# Patient Record
Sex: MALE | Race: WHITE | NOT HISPANIC OR LATINO | URBAN - METROPOLITAN AREA
[De-identification: names, ages, dates, MRNs, and addresses within clinical notes are randomized per-mention and may not be internally consistent; named-entity substitution may affect disease eponyms.]

---

## 2022-07-19 PROBLEM — E66.3 OVERWEIGHT: Status: ACTIVE | Noted: 2020-11-10

## 2022-09-07 ENCOUNTER — OFFICE (OUTPATIENT)
Dept: URBAN - METROPOLITAN AREA CLINIC 18 | Facility: CLINIC | Age: 42
End: 2022-09-07
Payer: MEDICAID

## 2022-09-07 VITALS
DIASTOLIC BLOOD PRESSURE: 82 MMHG | HEIGHT: 70 IN | WEIGHT: 315 LBS | SYSTOLIC BLOOD PRESSURE: 124 MMHG | HEART RATE: 78 BPM

## 2022-09-07 DIAGNOSIS — K51.80 OTHER ULCERATIVE COLITIS WITHOUT COMPLICATIONS: ICD-10-CM

## 2022-09-07 DIAGNOSIS — K21.9 GASTRO-ESOPHAGEAL REFLUX DISEASE WITHOUT ESOPHAGITIS: ICD-10-CM

## 2022-09-07 PROCEDURE — 99213 OFFICE O/P EST LOW 20 MIN: CPT | Performed by: INTERNAL MEDICINE

## 2022-09-07 RX ORDER — MESALAMINE 375 MG/1
CAPSULE, EXTENDED RELEASE ORAL
Qty: 180 | Refills: 3 | Status: ACTIVE
Start: 2021-05-03

## 2022-09-08 LAB
C-REACTIVE PROTEIN: 0.75 MG/DL
CBC, PLATELET CT  AND  DIFF: ABS BASOPHIL: 0 K/UL
CBC, PLATELET CT  AND  DIFF: ABS EOSINOPHIL: 0.1 K/UL
CBC, PLATELET CT  AND  DIFF: ABS IMMATURE GRANS: 0 K/UL
CBC, PLATELET CT  AND  DIFF: ABS LYMPHOCYTE: 1.1 K/UL
CBC, PLATELET CT  AND  DIFF: ABS MONOCYTE: 0.6 K/UL
CBC, PLATELET CT  AND  DIFF: ABS NEUTROPHIL: 3.7 K/UL
CBC, PLATELET CT  AND  DIFF: BASOPHIL: 0.4 %
CBC, PLATELET CT  AND  DIFF: DIFFERENTIAL: (no result)
CBC, PLATELET CT  AND  DIFF: EOSINOPHIL: 1.1 %
CBC, PLATELET CT  AND  DIFF: HEMATOCRIT: 43.5 %
CBC, PLATELET CT  AND  DIFF: HEMOGLOBIN: 15.2 G/DL
CBC, PLATELET CT  AND  DIFF: IMMATURE GRANULOCYTES: 0.2 %
CBC, PLATELET CT  AND  DIFF: LYMPHOCYTE: 20.3 %
CBC, PLATELET CT  AND  DIFF: MCH: 33.6 PG
CBC, PLATELET CT  AND  DIFF: MCHC: 34.9 G/DL
CBC, PLATELET CT  AND  DIFF: MCV: 96 FL
CBC, PLATELET CT  AND  DIFF: MONOCYTE: 10.2 %
CBC, PLATELET CT  AND  DIFF: MPV: 10.6 FL
CBC, PLATELET CT  AND  DIFF: NEUTROPHIL: 67.8 %
CBC, PLATELET CT  AND  DIFF: NRBCS: 0 /100 WBC
CBC, PLATELET CT  AND  DIFF: PLATELET COUNT: 246 K/UL
CBC, PLATELET CT  AND  DIFF: RBC: 4.53 M/UL
CBC, PLATELET CT  AND  DIFF: RDW: 13.6 %
CBC, PLATELET CT  AND  DIFF: WBC COUNT: 5.5 K/UL
COMPREHENSIVE METABOLIC PANEL: A/G RATIO: 1.5 RATIO
COMPREHENSIVE METABOLIC PANEL: ALBUMIN: 4.3 G/DL
COMPREHENSIVE METABOLIC PANEL: ALK PHOSPHATASE: 57 U/L
COMPREHENSIVE METABOLIC PANEL: ALT: 34 U/L
COMPREHENSIVE METABOLIC PANEL: AST: 26 U/L
COMPREHENSIVE METABOLIC PANEL: BILIRUBIN,TOTAL: 0.6 MG/DL
COMPREHENSIVE METABOLIC PANEL: BLOOD UREA NITROGEN: 11 MG/DL
COMPREHENSIVE METABOLIC PANEL: BUN/CREAT RATIO: 12
COMPREHENSIVE METABOLIC PANEL: CALCIUM: 9 MG/DL
COMPREHENSIVE METABOLIC PANEL: CHLORIDE: 103 MEQ/L
COMPREHENSIVE METABOLIC PANEL: CO2: 28 MEQ/L
COMPREHENSIVE METABOLIC PANEL: CREATININE: 0.9 MG/DL
COMPREHENSIVE METABOLIC PANEL: FASTING STATUS: (no result)
COMPREHENSIVE METABOLIC PANEL: GLOBULIN: 2.9 G/DL
COMPREHENSIVE METABOLIC PANEL: GLOMERULAR FILTRATION RATE (GFR): 111 MLS/MIN/1.73M2
COMPREHENSIVE METABOLIC PANEL: GLUCOSE,RANDOM: 99 MG/DL
COMPREHENSIVE METABOLIC PANEL: POTASSIUM: 4.1 MEQ/L
COMPREHENSIVE METABOLIC PANEL: SODIUM: 140 MEQ/L
COMPREHENSIVE METABOLIC PANEL: TOTAL PROTEIN: 7.2 G/DL
ESR: 11 MM/HR

## 2023-09-19 ENCOUNTER — OFFICE (OUTPATIENT)
Dept: URBAN - METROPOLITAN AREA CLINIC 18 | Facility: CLINIC | Age: 43
End: 2023-09-19

## 2023-09-19 VITALS
SYSTOLIC BLOOD PRESSURE: 134 MMHG | WEIGHT: 315 LBS | HEART RATE: 79 BPM | HEIGHT: 70 IN | DIASTOLIC BLOOD PRESSURE: 82 MMHG

## 2023-09-19 DIAGNOSIS — K51.80 OTHER ULCERATIVE COLITIS WITHOUT COMPLICATIONS: ICD-10-CM

## 2023-09-19 DIAGNOSIS — K21.9 GASTRO-ESOPHAGEAL REFLUX DISEASE WITHOUT ESOPHAGITIS: ICD-10-CM

## 2023-09-19 DIAGNOSIS — Z86.010 PERSONAL HISTORY OF COLONIC POLYPS: ICD-10-CM

## 2023-09-19 PROCEDURE — 99213 OFFICE O/P EST LOW 20 MIN: CPT | Performed by: INTERNAL MEDICINE

## 2023-09-19 RX ORDER — MESALAMINE 375 MG/1
CAPSULE, EXTENDED RELEASE ORAL
Qty: 180 | Refills: 3 | Status: ACTIVE
Start: 2021-05-03

## 2023-09-19 RX ORDER — DICYCLOMINE HYDROCHLORIDE 20 MG/1
60 TABLET ORAL
Qty: 90 | Refills: 11 | Status: ACTIVE
Start: 2022-12-21

## 2023-09-20 LAB
C-REACTIVE PROTEIN: 0.69 MG/DL
CBC, PLATELET CT  AND  DIFF: ABS BASOPHIL: 0 K/UL
CBC, PLATELET CT  AND  DIFF: ABS EOSINOPHIL: 0.1 K/UL
CBC, PLATELET CT  AND  DIFF: ABS IMMATURE GRANS: 0 K/UL
CBC, PLATELET CT  AND  DIFF: ABS LYMPHOCYTE: 1 K/UL
CBC, PLATELET CT  AND  DIFF: ABS MONOCYTE: 0.4 K/UL
CBC, PLATELET CT  AND  DIFF: ABS NEUTROPHIL: 3.9 K/UL
CBC, PLATELET CT  AND  DIFF: BASOPHIL: 0.4 %
CBC, PLATELET CT  AND  DIFF: DIFFERENTIAL: (no result)
CBC, PLATELET CT  AND  DIFF: EOSINOPHIL: 1.1 %
CBC, PLATELET CT  AND  DIFF: HEMATOCRIT: 40 %
CBC, PLATELET CT  AND  DIFF: HEMOGLOBIN: 14.7 G/DL
CBC, PLATELET CT  AND  DIFF: IMMATURE GRANULOCYTES: 0.2 %
CBC, PLATELET CT  AND  DIFF: LYMPHOCYTE: 18.3 %
CBC, PLATELET CT  AND  DIFF: MCH: 35.4 PG — HIGH
CBC, PLATELET CT  AND  DIFF: MCHC: 36.8 G/DL — HIGH
CBC, PLATELET CT  AND  DIFF: MCV: 96.4 FL
CBC, PLATELET CT  AND  DIFF: MONOCYTE: 7.5 %
CBC, PLATELET CT  AND  DIFF: MPV: 10.4 FL
CBC, PLATELET CT  AND  DIFF: NEUTROPHIL: 72.5 %
CBC, PLATELET CT  AND  DIFF: NRBCS: 0 /100 WBC
CBC, PLATELET CT  AND  DIFF: PLATELET COUNT: 246 K/UL
CBC, PLATELET CT  AND  DIFF: RBC: 4.15 M/UL
CBC, PLATELET CT  AND  DIFF: RDW: 13.4 %
CBC, PLATELET CT  AND  DIFF: WBC COUNT: 5.4 K/UL
COMPREHENSIVE METABOLIC PANEL: A/G RATIO: 1.3 RATIO
COMPREHENSIVE METABOLIC PANEL: ALBUMIN: 4.1 G/DL
COMPREHENSIVE METABOLIC PANEL: ALK PHOSPHATASE: 52 U/L
COMPREHENSIVE METABOLIC PANEL: ALT: 38 U/L
COMPREHENSIVE METABOLIC PANEL: AST: 31 U/L
COMPREHENSIVE METABOLIC PANEL: BILIRUBIN,TOTAL: 0.7 MG/DL
COMPREHENSIVE METABOLIC PANEL: BLOOD UREA NITROGEN: 8 MG/DL
COMPREHENSIVE METABOLIC PANEL: BUN/CREAT RATIO: 11
COMPREHENSIVE METABOLIC PANEL: CALCIUM: 9 MG/DL
COMPREHENSIVE METABOLIC PANEL: CHLORIDE: 103 MEQ/L
COMPREHENSIVE METABOLIC PANEL: CO2: 24 MEQ/L
COMPREHENSIVE METABOLIC PANEL: CREATININE: 0.7 MG/DL
COMPREHENSIVE METABOLIC PANEL: FASTING STATUS: (no result)
COMPREHENSIVE METABOLIC PANEL: GLOBULIN: 3.1 G/DL
COMPREHENSIVE METABOLIC PANEL: GLOMERULAR FILTRATION RATE (GFR): 119 MLS/MIN/1.73M2
COMPREHENSIVE METABOLIC PANEL: GLUCOSE,RANDOM: 96 MG/DL
COMPREHENSIVE METABOLIC PANEL: POTASSIUM: 3.8 MEQ/L
COMPREHENSIVE METABOLIC PANEL: SODIUM: 140 MEQ/L
COMPREHENSIVE METABOLIC PANEL: TOTAL PROTEIN: 7.2 G/DL
ESR: 9 MM/HR

## 2023-10-14 ENCOUNTER — AMBULATORY SURGICAL CENTER (OUTPATIENT)
Dept: URBAN - METROPOLITAN AREA SURGERY 7 | Facility: SURGERY | Age: 43
End: 2023-10-14
Payer: MEDICARE

## 2023-10-14 ENCOUNTER — OFFICE (OUTPATIENT)
Dept: URBAN - METROPOLITAN AREA PATHOLOGY 1 | Facility: PATHOLOGY | Age: 43
End: 2023-10-14
Payer: MEDICARE

## 2023-10-14 VITALS
OXYGEN SATURATION: 76 % | DIASTOLIC BLOOD PRESSURE: 81 MMHG | HEART RATE: 81 BPM | SYSTOLIC BLOOD PRESSURE: 188 MMHG | DIASTOLIC BLOOD PRESSURE: 107 MMHG | HEART RATE: 74 BPM | DIASTOLIC BLOOD PRESSURE: 75 MMHG | RESPIRATION RATE: 24 BRPM | OXYGEN SATURATION: 96 % | SYSTOLIC BLOOD PRESSURE: 167 MMHG | HEART RATE: 86 BPM | DIASTOLIC BLOOD PRESSURE: 82 MMHG | WEIGHT: 315 LBS | RESPIRATION RATE: 11 BRPM | HEART RATE: 78 BPM | DIASTOLIC BLOOD PRESSURE: 111 MMHG | DIASTOLIC BLOOD PRESSURE: 73 MMHG | SYSTOLIC BLOOD PRESSURE: 189 MMHG | SYSTOLIC BLOOD PRESSURE: 166 MMHG | HEART RATE: 80 BPM | SYSTOLIC BLOOD PRESSURE: 130 MMHG | TEMPERATURE: 97.9 F | SYSTOLIC BLOOD PRESSURE: 166 MMHG | HEART RATE: 89 BPM | HEART RATE: 90 BPM | RESPIRATION RATE: 24 BRPM | HEART RATE: 77 BPM | TEMPERATURE: 97.9 F | RESPIRATION RATE: 16 BRPM | OXYGEN SATURATION: 88 % | HEART RATE: 76 BPM | SYSTOLIC BLOOD PRESSURE: 200 MMHG | OXYGEN SATURATION: 80 % | DIASTOLIC BLOOD PRESSURE: 84 MMHG | SYSTOLIC BLOOD PRESSURE: 188 MMHG | SYSTOLIC BLOOD PRESSURE: 200 MMHG | DIASTOLIC BLOOD PRESSURE: 104 MMHG | OXYGEN SATURATION: 80 % | OXYGEN SATURATION: 79 % | HEART RATE: 89 BPM | SYSTOLIC BLOOD PRESSURE: 149 MMHG | WEIGHT: 315 LBS | DIASTOLIC BLOOD PRESSURE: 104 MMHG | OXYGEN SATURATION: 97 % | DIASTOLIC BLOOD PRESSURE: 73 MMHG | SYSTOLIC BLOOD PRESSURE: 189 MMHG | RESPIRATION RATE: 17 BRPM | DIASTOLIC BLOOD PRESSURE: 81 MMHG | DIASTOLIC BLOOD PRESSURE: 102 MMHG | OXYGEN SATURATION: 96 % | DIASTOLIC BLOOD PRESSURE: 113 MMHG | DIASTOLIC BLOOD PRESSURE: 75 MMHG | DIASTOLIC BLOOD PRESSURE: 111 MMHG | RESPIRATION RATE: 14 BRPM | OXYGEN SATURATION: 98 % | SYSTOLIC BLOOD PRESSURE: 130 MMHG | DIASTOLIC BLOOD PRESSURE: 82 MMHG | DIASTOLIC BLOOD PRESSURE: 102 MMHG | HEART RATE: 78 BPM | SYSTOLIC BLOOD PRESSURE: 167 MMHG | HEIGHT: 70 IN | RESPIRATION RATE: 17 BRPM | RESPIRATION RATE: 9 BRPM | OXYGEN SATURATION: 94 % | OXYGEN SATURATION: 76 % | HEIGHT: 70 IN | RESPIRATION RATE: 14 BRPM | DIASTOLIC BLOOD PRESSURE: 113 MMHG | RESPIRATION RATE: 16 BRPM | DIASTOLIC BLOOD PRESSURE: 84 MMHG | DIASTOLIC BLOOD PRESSURE: 107 MMHG | HEART RATE: 77 BPM | RESPIRATION RATE: 9 BRPM | HEART RATE: 86 BPM | SYSTOLIC BLOOD PRESSURE: 186 MMHG | SYSTOLIC BLOOD PRESSURE: 177 MMHG | SYSTOLIC BLOOD PRESSURE: 151 MMHG | RESPIRATION RATE: 11 BRPM | SYSTOLIC BLOOD PRESSURE: 177 MMHG | HEART RATE: 70 BPM | HEART RATE: 70 BPM | OXYGEN SATURATION: 94 % | SYSTOLIC BLOOD PRESSURE: 149 MMHG | RESPIRATION RATE: 20 BRPM | OXYGEN SATURATION: 88 % | SYSTOLIC BLOOD PRESSURE: 186 MMHG | OXYGEN SATURATION: 79 % | HEART RATE: 68 BPM | HEART RATE: 68 BPM | RESPIRATION RATE: 20 BRPM | HEART RATE: 76 BPM | OXYGEN SATURATION: 97 % | HEART RATE: 74 BPM | HEART RATE: 80 BPM | SYSTOLIC BLOOD PRESSURE: 151 MMHG | HEART RATE: 81 BPM | OXYGEN SATURATION: 98 % | HEART RATE: 90 BPM

## 2023-10-14 DIAGNOSIS — Z86.010 PERSONAL HISTORY OF COLONIC POLYPS: ICD-10-CM

## 2023-10-14 DIAGNOSIS — K64.1 SECOND DEGREE HEMORRHOIDS: ICD-10-CM

## 2023-10-14 DIAGNOSIS — K57.30 DIVERTICULOSIS OF LARGE INTESTINE WITHOUT PERFORATION OR ABS: ICD-10-CM

## 2023-10-14 DIAGNOSIS — Z53.8 PROCEDURE AND TREATMENT NOT CARRIED OUT FOR OTHER REASONS: ICD-10-CM

## 2023-10-14 DIAGNOSIS — K51.80 OTHER ULCERATIVE COLITIS WITHOUT COMPLICATIONS: ICD-10-CM

## 2023-10-14 PROCEDURE — 88305 TISSUE EXAM BY PATHOLOGIST: CPT | Performed by: PATHOLOGY

## 2023-10-14 PROCEDURE — 45380 COLONOSCOPY AND BIOPSY: CPT | Mod: 53,PT | Performed by: INTERNAL MEDICINE

## 2023-10-14 PROCEDURE — 45380 COLONOSCOPY AND BIOPSY: CPT | Mod: PT,53 | Performed by: INTERNAL MEDICINE

## 2023-10-14 PROCEDURE — 45380 COLONOSCOPY AND BIOPSY: CPT | Mod: 74,PT | Performed by: INTERNAL MEDICINE

## 2023-10-14 NOTE — SERVICEHPINOTES
42-year-old male with about 20-year history of ulcerative colitis. Remains in remission symptomatically with combination of Apriso and 6-mercaptopurine. He does have history of sessile serrated lesions on prior colonoscopies. His last couple of colonoscopies have been incomplete due to very dilated, distended and redundant colon. Last attempted colonoscopy was in 2021. At that time scope was estimated to have been advanced to proximal transverse colon.

## 2023-10-14 NOTE — SERVICENOTES
Due to patient's history of right-sided sessile serrated lesion submucosal essential that we get colonoscopy completed.  I am hopeful that doing so at the hospital using enteroscope will allow this to be successful.  However, his anatomy is very difficult and is further complicated by his body habitus.

## 2023-10-18 LAB
PDF: PDF REPORT: (no result)
PDF: PDF REPORT: (no result)

## 2025-05-14 ENCOUNTER — OFFICE (OUTPATIENT)
Dept: URBAN - METROPOLITAN AREA CLINIC 18 | Age: 45
End: 2025-05-14
Payer: MEDICARE

## 2025-05-14 VITALS
HEIGHT: 70 IN | WEIGHT: 315 LBS | SYSTOLIC BLOOD PRESSURE: 138 MMHG | HEART RATE: 86 BPM | DIASTOLIC BLOOD PRESSURE: 82 MMHG | OXYGEN SATURATION: 98 %

## 2025-05-14 DIAGNOSIS — K51.80 OTHER ULCERATIVE COLITIS WITHOUT COMPLICATIONS: ICD-10-CM

## 2025-05-14 DIAGNOSIS — K59.00 CONSTIPATION, UNSPECIFIED: ICD-10-CM

## 2025-05-14 PROCEDURE — 99213 OFFICE O/P EST LOW 20 MIN: CPT | Performed by: INTERNAL MEDICINE
